# Patient Record
Sex: MALE | Race: WHITE | HISPANIC OR LATINO | Employment: FULL TIME | URBAN - METROPOLITAN AREA
[De-identification: names, ages, dates, MRNs, and addresses within clinical notes are randomized per-mention and may not be internally consistent; named-entity substitution may affect disease eponyms.]

---

## 2018-04-16 ENCOUNTER — OFFICE VISIT (OUTPATIENT)
Dept: URGENT CARE | Facility: CLINIC | Age: 43
End: 2018-04-16
Payer: COMMERCIAL

## 2018-04-16 VITALS
DIASTOLIC BLOOD PRESSURE: 64 MMHG | WEIGHT: 180 LBS | HEIGHT: 67 IN | OXYGEN SATURATION: 96 % | TEMPERATURE: 98 F | BODY MASS INDEX: 28.25 KG/M2 | SYSTOLIC BLOOD PRESSURE: 128 MMHG | HEART RATE: 95 BPM | RESPIRATION RATE: 16 BRPM

## 2018-04-16 DIAGNOSIS — J30.1 SEASONAL ALLERGIC RHINITIS DUE TO POLLEN: ICD-10-CM

## 2018-04-16 DIAGNOSIS — J45.31 MILD PERSISTENT EXTRINSIC ASTHMA WITH ACUTE EXACERBATION: Primary | ICD-10-CM

## 2018-04-16 PROCEDURE — 99284 EMERGENCY DEPT VISIT MOD MDM: CPT | Performed by: PHYSICIAN ASSISTANT

## 2018-04-16 PROCEDURE — G0383 LEV 4 HOSP TYPE B ED VISIT: HCPCS | Performed by: PHYSICIAN ASSISTANT

## 2018-04-16 RX ORDER — ALBUTEROL SULFATE 90 UG/1
2 AEROSOL, METERED RESPIRATORY (INHALATION) EVERY 6 HOURS PRN
COMMUNITY
End: 2019-03-02 | Stop reason: SDUPTHER

## 2018-04-16 RX ORDER — FLUOXETINE 10 MG/1
10 CAPSULE ORAL DAILY
COMMUNITY
End: 2018-07-09

## 2018-04-16 RX ORDER — BUTALBITAL, ACETAMINOPHEN AND CAFFEINE 50; 325; 40 MG/1; MG/1; MG/1
1 TABLET ORAL EVERY 4 HOURS PRN
COMMUNITY

## 2018-04-16 RX ORDER — CYCLOBENZAPRINE HCL 10 MG
10 TABLET ORAL 3 TIMES DAILY PRN
COMMUNITY
End: 2018-07-09

## 2018-04-16 RX ORDER — CETIRIZINE HYDROCHLORIDE, PSEUDOEPHEDRINE HYDROCHLORIDE 5; 120 MG/1; MG/1
1 TABLET, FILM COATED, EXTENDED RELEASE ORAL 2 TIMES DAILY
Qty: 30 TABLET | Refills: 0 | Status: SHIPPED | OUTPATIENT
Start: 2018-04-16

## 2018-04-16 RX ORDER — FLUTICASONE PROPIONATE 50 MCG
1 SPRAY, SUSPENSION (ML) NASAL DAILY
Qty: 16 G | Refills: 0 | Status: SHIPPED | OUTPATIENT
Start: 2018-04-16

## 2018-04-16 RX ORDER — IBUPROFEN 800 MG/1
TABLET ORAL EVERY 6 HOURS PRN
COMMUNITY

## 2018-04-16 NOTE — PROGRESS NOTES
[facility]        NAME: Argenis Mott is a 40 y o  male  : 1975    MRN: [id number]  DATE: 2018  TIME: 12:09 PM    Assessment and Plan   Mild persistent extrinsic asthma with acute exacerbation [J45 31]  1  Mild persistent extrinsic asthma with acute exacerbation  fluticasone-salmeterol (ADVAIR) 500-50 mcg/dose    cetirizine-pseudoephedrine (ZyrTEC-D) 5-120 MG per tablet   2  Seasonal allergic rhinitis due to pollen  fluticasone (FLONASE) 50 mcg/act nasal spray         Patient Instructions     Resume advair, flonase, zyrtec D as directed and albuterol as needed for your allergies/asthma  Follow up with your family physician to maintain these prescriptions  ER if worsening    Chief Complaint     Chief Complaint   Patient presents with    Allergies     pt has sinus congestion, headache, and pt stated he feels like it is working into his chest x 1 week  pt was using sudafed congestion and regular zrytec with no relief         History of Present Illness       39 y/o male presents with seasonal allergies affecting his asthma  He states that he usually takes advair, flonase and zyrtec D BID during allergy season, but he is out of those medications  He is c/o one week of allergy flare up causing nasal congestion, chest tightness, wheezing and dry cough  He has tried taking regular zyrtec w/o relief  He has been using his ventolin inhaler as needed  Review of Systems   Review of Systems   Constitutional: Negative for chills, fatigue and fever  HENT: Positive for congestion, postnasal drip, rhinorrhea, sinus pressure and sneezing  Negative for sore throat  Respiratory: Positive for cough, chest tightness, shortness of breath and wheezing  Cardiovascular: Negative for chest pain  Neurological: Positive for headaches  All other systems reviewed and are negative          Current Medications       Current Outpatient Prescriptions:     albuterol (PROVENTIL HFA,VENTOLIN HFA) 90 mcg/act inhaler, Inhale 2 puffs every 6 (six) hours as needed for wheezing, Disp: , Rfl:     cyclobenzaprine (FLEXERIL) 10 mg tablet, Take 10 mg by mouth 3 (three) times a day as needed for muscle spasms, Disp: , Rfl:     FLUoxetine (PROzac) 10 mg capsule, Take 10 mg by mouth daily, Disp: , Rfl:     ibuprofen (MOTRIN) 800 mg tablet, Take by mouth every 6 (six) hours as needed for mild pain, Disp: , Rfl:     omeprazole (PriLOSEC) 20 mg delayed release capsule, Take 20 mg by mouth daily  , Disp: , Rfl:     cetirizine-pseudoephedrine (ZyrTEC-D) 5-120 MG per tablet, Take 1 tablet by mouth 2 (two) times a day, Disp: 30 tablet, Rfl: 0    fluticasone (FLONASE) 50 mcg/act nasal spray, 1 spray into each nostril daily, Disp: 16 g, Rfl: 0    fluticasone-salmeterol (ADVAIR) 500-50 mcg/dose, Inhale 1 puff 2 (two) times a day, Disp: 1 Inhaler, Rfl: 0    Current Allergies     Allergies as of 04/16/2018    (No Known Allergies)            The following portions of the patient's history were reviewed and updated as appropriate: allergies, current medications, past family history, past medical history, past social history, past surgical history and problem list    Past Medical History:   Diagnosis Date    Major depression     Psychiatric disorder      History reviewed  No pertinent surgical history  Objective   /64   Pulse 95   Temp 98 °F (36 7 °C)   Resp 16   Ht 5' 7" (1 702 m)   Wt 81 6 kg (180 lb)   SpO2 96%   BMI 28 19 kg/m²        Physical Exam     Physical Exam   Constitutional: He is oriented to person, place, and time  He appears well-developed and well-nourished  HENT:   Head: Normocephalic and atraumatic  Right Ear: Tympanic membrane and external ear normal    Left Ear: Tympanic membrane and external ear normal    Mouth/Throat: Oropharynx is clear and moist    Neck: Neck supple  Cardiovascular: Normal rate, regular rhythm and normal heart sounds  Pulmonary/Chest: No respiratory distress  He has no wheezes  He exhibits no tenderness  Dry cough   Neurological: He is alert and oriented to person, place, and time  Psychiatric: He has a normal mood and affect  His behavior is normal    Nursing note and vitals reviewed

## 2018-04-16 NOTE — PATIENT INSTRUCTIONS
Resume advair, flonase, zyrtec D as directed and albuterol as needed for your allergies/asthma  Follow up with your family physician to maintain these prescriptions  ER if worsening    Allergies   WHAT YOU NEED TO KNOW:   Allergies are an immune system reaction to a substance called an allergen  Your immune system sees the allergen as harmful and attacks it  An allergic reaction can be mild or life-threatening  A life-threatening reaction is called anaphylaxis  Anaphylaxis is a sudden, life-threatening reaction that needs immediate treatment  DISCHARGE INSTRUCTIONS:   Call 911 for signs or symptoms of anaphylaxis,  such as trouble breathing, swelling in your mouth or throat, or wheezing  You may also have itching, a rash, hives, or feel like you are going to faint  Return to the emergency department if:   · You have tingling in your hands or feet  · Your skin is red or flushed  Contact your healthcare provider if:   · You have questions or concerns about your condition or care  Medicines:   · Antihistamines  help decrease itching, sneezing, and swelling  You may take them as a pill or use drops in your nose or eyes  · Decongestants  help your nose feel less stuffy  · Topical treatments  help decrease itching or swelling  You also may be given nasal sprays or eyedrops  · Epinephrine  is used to treat a severe allergic reaction such as anaphylaxis  · Take your medicine as directed  Contact your healthcare provider if you think your medicine is not helping or if you have side effects  Tell him of her if you are allergic to any medicine  Keep a list of the medicines, vitamins, and herbs you take  Include the amounts, and when and why you take them  Bring the list or the pill bottles to follow-up visits  Carry your medicine list with you in case of an emergency  Steps to take for signs or symptoms of anaphylaxis:   · Immediately  give 1 shot of epinephrine only into the outer thigh muscle  · Leave the shot in place  as directed  Your healthcare provider may recommend you leave it in place for up to 10 seconds before you remove it  This helps make sure all of the epinephrine is delivered  · Call 911 and go to the emergency department,  even if the shot improved symptoms  Do not drive yourself  Bring the used epinephrine shot with you  Safety precautions to take if you are at risk for anaphylaxis:   · Keep 2 shots of epinephrine with you at all times  You may need a second shot, because epinephrine only works for about 20 minutes and symptoms may return  Your healthcare provider can show you and family members how to give the shot  Check the expiration date every month and replace it before it expires  · Create an action plan  Your healthcare provider can help you create a written plan that explains the allergy and an emergency plan to treat a reaction  The plan explains when to give a second epinephrine shot if symptoms return or do not improve after the first  Give copies of the action plan and emergency instructions to family members, work and school staff, and  providers  Show them how to give a shot of epinephrine  · Be careful when you exercise  If you have had exercise-induced anaphylaxis, do not exercise right after you eat  Stop exercising right away if you start to develop any signs or symptoms of anaphylaxis  You may first feel tired, warm, or have itchy skin  Hives, swelling, and severe breathing problems may develop if you continue to exercise  · Carry medical alert identification  Wear medical alert jewelry or carry a card that explains the allergy  Ask your healthcare provider where to get these items  · Inform all healthcare providers of the allergy  This includes dentists, nurses, doctors, and surgeons  Manage allergies:   · Use nasal rinses as directed  Rinse with a saline solution daily to help clear your nose of allergens  · Do not smoke  Allergy symptoms may decrease if you are not around smoke  Nicotine and other chemicals in cigarettes and cigars can cause lung damage  Ask your healthcare provider for information if you currently smoke and need help to quit  E-cigarettes or smokeless tobacco still contain nicotine  Talk to your healthcare provider before you use these products  Prevent allergic reactions:   · Do not go outside when pollen counts are high if you have seasonal allergies  Your symptoms may be better if you go outside only in the morning or evening  Use your air conditioner, and change air filters often  · Avoid dust, fur, and mold  Dust and vacuum your home often  You may want to wear a mask when you vacuum  Keep pets in certain rooms, and bathe them often  Use a dehumidifier (machine that decreases moisture) to help prevent mold  · Do not use products that contain latex if you have a latex allergy  Use nonlatex gloves if you work in healthcare or in food preparation  Always tell healthcare providers about a latex allergy  · Avoid areas that attract insects if you have an insect bite or sting allergy  Areas include trash cans, gardens, and picnics  Do not wear bright clothing or strong scents when you will be outside  Follow up with your healthcare provider as directed:  Write down your questions so you remember to ask them during your visits  When you have an allergic reaction, write down everything you were exposed to in the 2 hours before the reaction  Take that information to your next visit  © 2017 [employer] Information is for End User's use only and may not be sold, redistributed or otherwise used for commercial purposes  All illustrations and images included in CareNotes® are the copyrighted property of [employer]  or [employer]  The above information is an  only  It is not intended as medical advice for individual conditions or treatments   Talk to your doctor, nurse or pharmacist before following any medical regimen to see if it is safe and effective for you  Allergic Rhinitis   WHAT YOU NEED TO KNOW:   Allergic rhinitis, or hay fever, is swelling of the inside of your nose  The swelling is a reaction to allergens in the air  An allergen can be anything that causes an allergic reaction  Allergies to weeds, grass, trees, or mold often cause seasonal allergic rhinitis  Indoor dust mites, cockroaches, pet dander, or mold can also cause allergic rhinitis  DISCHARGE INSTRUCTIONS:   Call 911 for the following:   · You have chest pain or shortness of breath  Return to the emergency department if:   · You have severe pain  · You cough up blood  Contact your healthcare provider if:   · You have a fever  · You have ear or sinus pain, or a headache  · Your symptoms get worse, even after treatment  · You have yellow, green, brown, or bloody mucus coming from your nose  · Your nose is bleeding or you have pain inside your nose  · You have trouble sleeping because of your symptoms  · You have questions or concerns about your condition or care  Medicines:   · Medicines  help decrease your symptoms and clear your stuffy nose  · Take your medicine as directed  Contact your healthcare provider if you think your medicine is not helping or if you have side effects  Tell him of her if you are allergic to any medicine  Keep a list of the medicines, vitamins, and herbs you take  Include the amounts, and when and why you take them  Bring the list or the pill bottles to follow-up visits  Carry your medicine list with you in case of an emergency  How to manage allergic rhinitis:  The best way to manage allergic rhinitis is to avoid allergens that can trigger your symptoms  Any of the following may help decrease your symptoms:  · Rinse your nose and sinuses  with a salt water solution or use a salt water nasal spray   This will help thin the mucus in your nose and rinse away pollen and dirt  It will also help reduce swelling so you can breathe normally  Ask your healthcare provider how often to rinse your nose  · Reduce exposure to dust mites  Wash sheets and towels in hot water every week  Cover your pillows and mattresses with allergen-free covers  Limit the number of stuffed animals and soft toys your child has  Wash your child's toys in hot water regularly  Vacuum weekly and use a vacuum  with an air filter  If possible, get rid of carpets and curtains  These collect dust and dust mites  · Reduce exposure to pollen  Keep windows and doors closed in your house and car  Stay inside when air pollution or the pollen count is high  Run your air conditioner on recycle, and change air filters often  Shower and wash your hair before bed every night to rinse away pollen  · Reduce exposure to pet dander  If possible, do not keep cats, dogs, birds, or other pets  If you do keep pets in your home, keep them out of bedrooms and carpeted rooms  Bathe them often  · Reduce exposure to mold  Do not spend time in basements  Choose artificial plants instead of live plants  Keep your home's humidity at less than 45%  Do not have ponds or standing water in your home or yard  · Do not smoke  Avoid others who smoke  Ask your healthcare provider for information if you currently smoke and need help to quit  Follow up with your healthcare provider as directed: You may need to see an allergist often to control your symptoms  Write down your questions so you remember to ask them during your visits  © 2017 [employer] Information is for End User's use only and may not be sold, redistributed or otherwise used for commercial purposes  All illustrations and images included in CareNotes® are the copyrighted property of [employer]  or [employer]ando Connell  The above information is an  only   It is not intended as medical advice for individual conditions or treatments  Talk to your doctor, nurse or pharmacist before following any medical regimen to see if it is safe and effective for you

## 2018-07-09 ENCOUNTER — HOSPITAL ENCOUNTER (EMERGENCY)
Facility: HOSPITAL | Age: 43
Discharge: HOME | End: 2018-07-09
Admitting: EMERGENCY MEDICINE

## 2018-07-09 ENCOUNTER — APPOINTMENT (EMERGENCY)
Dept: RADIOLOGY | Facility: HOSPITAL | Age: 43
End: 2018-07-09

## 2018-07-09 VITALS
SYSTOLIC BLOOD PRESSURE: 137 MMHG | WEIGHT: 165 LBS | TEMPERATURE: 97.2 F | DIASTOLIC BLOOD PRESSURE: 74 MMHG | BODY MASS INDEX: 25.9 KG/M2 | HEART RATE: 87 BPM | HEIGHT: 67 IN | RESPIRATION RATE: 18 BRPM | OXYGEN SATURATION: 96 %

## 2018-07-09 DIAGNOSIS — L03.031 PARONYCHIA OF GREAT TOE, RIGHT: Primary | ICD-10-CM

## 2018-07-09 PROCEDURE — 99283 EMERGENCY DEPT VISIT LOW MDM: CPT

## 2018-07-09 PROCEDURE — 73630 X-RAY EXAM OF FOOT: CPT

## 2018-07-09 RX ORDER — SULFAMETHOXAZOLE AND TRIMETHOPRIM 800; 160 MG/1; MG/1
1 TABLET ORAL 2 TIMES DAILY
Qty: 14 TABLET | Refills: 0 | Status: SHIPPED | OUTPATIENT
Start: 2018-07-09 | End: 2018-07-16

## 2018-07-09 NOTE — ED PROVIDER NOTES
History  Chief Complaint   Patient presents with    Toe Injury     Pt injured R toe while climbing out of truck  60-year-old male presents the ER with right great toe injury  Patient states that approximately a week ago he hurt his right great toe while getting out of his truck and states that area had been bleeding at that time  Patient states that since then the area has become red, swollen, and warm to touch and at times has had pus drainage from area  Patient works in pool construction and states that he has increased pain when trying to climb out of holes that have been dog for pulls and his future constantly getting wet due to his work  Patient denies fevers, chills, nausea, vomiting, streaking redness  Patient admits to increased pain, redness, warmth, swelling of area  Prior to Admission Medications   Prescriptions Last Dose Informant Patient Reported? Taking? albuterol (PROVENTIL HFA,VENTOLIN HFA) 90 mcg/act inhaler   Yes Yes   Sig: Inhale 2 puffs every 6 (six) hours as needed for wheezing   butalbital-acetaminophen-caffeine (FIORICET,ESGIC) -40 mg per tablet   Yes Yes   Sig: Take 1 tablet by mouth every 4 (four) hours as needed for headaches   cetirizine-pseudoephedrine (ZyrTEC-D) 5-120 MG per tablet   No Yes   Sig: Take 1 tablet by mouth 2 (two) times a day   fluticasone (FLONASE) 50 mcg/act nasal spray   No Yes   Si spray into each nostril daily   fluticasone-salmeterol (ADVAIR) 500-50 mcg/dose   No Yes   Sig: Inhale 1 puff 2 (two) times a day   ibuprofen (MOTRIN) 800 mg tablet   Yes Yes   Sig: Take by mouth every 6 (six) hours as needed for mild pain      Facility-Administered Medications: None       Past Medical History:   Diagnosis Date    Asthma     Major depression     Psychiatric disorder        History reviewed  No pertinent surgical history  History reviewed  No pertinent family history  I have reviewed and agree with the history as documented      Social History   Substance Use Topics    Smoking status: Current Every Day Smoker     Packs/day: 1 00     Types: Cigarettes    Smokeless tobacco: Never Used    Alcohol use No        Review of Systems   Constitutional: Negative  HENT: Negative  Eyes: Negative  Respiratory: Negative  Cardiovascular: Negative  Gastrointestinal: Negative  Genitourinary: Negative  Musculoskeletal: Positive for arthralgias and joint swelling  Negative for gait problem  Skin: Positive for color change and wound  Neurological: Negative  All other systems reviewed and are negative  Physical Exam  Physical Exam   Constitutional: He is oriented to person, place, and time  Vital signs are normal  He appears well-developed and well-nourished  HENT:   Head: Normocephalic and atraumatic  Eyes: Conjunctivae and EOM are normal  Pupils are equal, round, and reactive to light  Neck: Normal range of motion  Neck supple  Cardiovascular: Normal rate, regular rhythm and normal heart sounds  Pulses:       Dorsalis pedis pulses are 2+ on the right side, and 2+ on the left side  Pulmonary/Chest: Effort normal and breath sounds normal    Musculoskeletal: Normal range of motion  Right foot: There is tenderness and swelling  There is normal range of motion, no bony tenderness, normal capillary refill and no deformity  Feet:    Feet:   Right Foot:   Skin Integrity: Positive for erythema and warmth  Neurological: He is alert and oriented to person, place, and time  Skin: Skin is warm and dry  Capillary refill takes less than 2 seconds  There is erythema (With warmth and swelling to lateral aspect of great toe along side of the nail)  Psychiatric: He has a normal mood and affect  His speech is normal and behavior is normal  Judgment and thought content normal  Cognition and memory are normal    Nursing note and vitals reviewed        Vital Signs  ED Triage Vitals [07/09/18 1802]   Temperature Pulse Respirations Blood Pressure SpO2   (!) 97 2 °F (36 2 °C) 87 18 137/74 96 %      Temp Source Heart Rate Source Patient Position - Orthostatic VS BP Location FiO2 (%)   Temporal Monitor Sitting Right arm --      Pain Score       8           Vitals:    07/09/18 1802   BP: 137/74   Pulse: 87   Patient Position - Orthostatic VS: Sitting       Visual Acuity      ED Medications  Medications - No data to display    Diagnostic Studies  Results Reviewed     None                 XR foot 3+ vw right   Final Result by Giuliana Chavez MD (07/10 0820)      No acute osseous abnormality  Workstation performed: [id number]                    Procedures  Procedures       Phone Contacts  ED Phone Contact    ED Course                               MDM  Number of Diagnoses or Management Options  Paronychia of great toe, right: new and requires workup     Amount and/or Complexity of Data Reviewed  Tests in the radiology section of CPT®: ordered and reviewed    Patient Progress  Patient progress: stable    CritCare Time    Disposition  Final diagnoses:   Paronychia of great toe, right     Time reflects when diagnosis was documented in both MDM as applicable and the Disposition within this note     Time User Action Codes Description Comment    7/9/2018  7:31 PM Case Morales Add [Y38 [id number]] Paronychia of great toe, right       ED Disposition     ED Disposition Condition Comment    Discharge  [address] discharge to home/self care  Condition at discharge: Stable        Follow-up Information     Follow up With Specialties Details Why [address] [facility]  Call For Recheck, If symptoms worsen [facility]    [address]            Discharge Medication List as of 7/9/2018  7:33 PM      START taking these medications    Details   sulfamethoxazole-trimethoprim (BACTRIM DS) 800-160 mg per tablet Take 1 tablet by mouth 2 (two) times a day for 7 days smx-tmp DS (BACTRIM) 800-160 mg tabs (1tab q12 D10), Starting Mon 7/9/2018, Until Mon 7/16/2018, Normal         CONTINUE these medications which have NOT CHANGED    Details   albuterol (PROVENTIL HFA,VENTOLIN HFA) 90 mcg/act inhaler Inhale 2 puffs every 6 (six) hours as needed for wheezing, Historical Med      butalbital-acetaminophen-caffeine (FIORICET,ESGIC) -40 mg per tablet Take 1 tablet by mouth every 4 (four) hours as needed for headaches, Historical Med      cetirizine-pseudoephedrine (ZyrTEC-D) 5-120 MG per tablet Take 1 tablet by mouth 2 (two) times a day, Starting Mon 4/16/2018, Normal      fluticasone (FLONASE) 50 mcg/act nasal spray 1 spray into each nostril daily, Starting Mon 4/16/2018, Normal      fluticasone-salmeterol (ADVAIR) 500-50 mcg/dose Inhale 1 puff 2 (two) times a day, Starting Mon 4/16/2018, Normal      ibuprofen (MOTRIN) 800 mg tablet Take by mouth every 6 (six) hours as needed for mild pain, Historical Med           No discharge procedures on file      ED Provider  Electronically Signed by           Wilder West PA-C  07/17/18 9222

## 2018-07-09 NOTE — DISCHARGE INSTRUCTIONS
Paronychia   WHAT YOU NEED TO KNOW:   Paronychia is an infection of your nail fold caused by bacteria or a fungus  The nail fold is the skin around your nail  Paronychia may happen suddenly and last for 6 weeks or longer  You may have paronychia on more than 1 finger or toe  DISCHARGE INSTRUCTIONS:   Medicines:   · Td vaccine  is a booster shot used to help prevent tetanus and diphtheria  The Td booster may be given to adolescents and adults every 10 years or for certain wounds and injuries  · Antibiotics: This medicine will help fight or prevent an infection  It may be given as a pill, cream, or ointment  · Steroids: This medicine will help decrease inflammation  It may be given as a pill, cream, or ointment  · Antifungal medicine: This medicine helps kill fungus that may be causing your infection  It may be given as a cream or ointment  · NSAIDs:  These medicines decrease pain and swelling  NSAIDs are available without a doctor's order  Ask your healthcare provider which medicine is right for you  Ask how much to take and when to take it  Take as directed  NSAIDs can cause stomach bleeding and kidney problems if not taken correctly  · Take your medicine as directed  Contact your healthcare provider if you think your medicine is not helping or if you have side effects  Tell him of her if you are allergic to any medicine  Keep a list of the medicines, vitamins, and herbs you take  Include the amounts, and when and why you take them  Bring the list or the pill bottles to follow-up visits  Carry your medicine list with you in case of an emergency  Follow up with your healthcare provider as directed:  Write down your questions so you remember to ask them during your visits  Self-care:   · Soak your nail:  Soak your nail in a mixture of equal parts vinegar and water 3 or 4 times each day  This will help decrease inflammation      · Apply a warm compress:  Soak a washcloth in warm water and place it on your nail  This will help decrease inflammation  · Elevate:  Raise your nail above the level of your heart as often as you can  This will help decrease swelling and pain  Prop your nail on pillows or blankets to keep it elevated comfortably  · Use lotion:  Apply lotion after you wash your hands  This will prevent your skin from becoming too dry  Prevent paronychia:   · Avoid chemicals and allergens that may harm your skin and nails  This includes soaps, laundry detergents, and nail products  · Keep your nails clean and dry  Avoid soaking your nails in water  Use cotton-lined rubber gloves or wear 2 rubber gloves if you work with food or water  The gloves will help protect your nail folds  · Keep your nails short  Do not bite your nails, pick at your hangnails, suck your fingers, or wear fake nails  Bring your own nail tools when you go to the nail salon  Contact your healthcare provider if:   · Your nail becomes loose, deformed, or falls off  · You have a large abscess on your nail  · You have questions or concerns about your condition or care  Return to the emergency department if:   · You have severe nail pain  · The inflammation spreads to your hand or arm  © 2017 [facility] Information is for End User's use only and may not be sold, redistributed or otherwise used for commercial purposes  All illustrations and images included in CareNotes® are the copyrighted property of [employer]  or Hernando Connell  The above information is an  only  It is not intended as medical advice for individual conditions or treatments  Talk to your doctor, nurse or pharmacist before following any medical regimen to see if it is safe and effective for you

## 2019-03-02 ENCOUNTER — HOSPITAL ENCOUNTER (EMERGENCY)
Facility: HOSPITAL | Age: 44
Discharge: HOME | End: 2019-03-02
Attending: EMERGENCY MEDICINE
Payer: COMMERCIAL

## 2019-03-02 ENCOUNTER — APPOINTMENT (EMERGENCY)
Dept: RADIOLOGY | Facility: HOSPITAL | Age: 44
End: 2019-03-02
Payer: COMMERCIAL

## 2019-03-02 VITALS
OXYGEN SATURATION: 96 % | BODY MASS INDEX: 27.31 KG/M2 | RESPIRATION RATE: 20 BRPM | SYSTOLIC BLOOD PRESSURE: 117 MMHG | TEMPERATURE: 97.5 F | WEIGHT: 174 LBS | DIASTOLIC BLOOD PRESSURE: 81 MMHG | HEART RATE: 80 BPM | HEIGHT: 67 IN

## 2019-03-02 DIAGNOSIS — J45.31 MILD PERSISTENT EXTRINSIC ASTHMA WITH ACUTE EXACERBATION: ICD-10-CM

## 2019-03-02 DIAGNOSIS — J40 BRONCHITIS: ICD-10-CM

## 2019-03-02 DIAGNOSIS — J06.9 VIRAL URI WITH COUGH: Primary | ICD-10-CM

## 2019-03-02 LAB
ALBUMIN SERPL BCP-MCNC: 3.4 G/DL (ref 3.5–5)
ALP SERPL-CCNC: 82 U/L (ref 46–116)
ALT SERPL W P-5'-P-CCNC: 27 U/L (ref 12–78)
ANION GAP SERPL CALCULATED.3IONS-SCNC: 7 MMOL/L (ref 4–13)
AST SERPL W P-5'-P-CCNC: 26 U/L (ref 5–45)
BASOPHILS # BLD AUTO: 0.02 THOUSANDS/ΜL (ref 0–0.1)
BASOPHILS NFR BLD AUTO: 0 % (ref 0–1)
BILIRUB SERPL-MCNC: 0.2 MG/DL (ref 0.2–1)
BUN SERPL-MCNC: 17 MG/DL (ref 5–25)
CALCIUM SERPL-MCNC: 8.2 MG/DL (ref 8.3–10.1)
CHLORIDE SERPL-SCNC: 106 MMOL/L (ref 100–108)
CO2 SERPL-SCNC: 28 MMOL/L (ref 21–32)
CREAT SERPL-MCNC: 1.09 MG/DL (ref 0.6–1.3)
EOSINOPHIL # BLD AUTO: 0.32 THOUSAND/ΜL (ref 0–0.61)
EOSINOPHIL NFR BLD AUTO: 4 % (ref 0–6)
ERYTHROCYTE [DISTWIDTH] IN BLOOD BY AUTOMATED COUNT: 13 % (ref 11.6–15.1)
GFR SERPL CREATININE-BSD FRML MDRD: 83 ML/MIN/1.73SQ M
GLUCOSE SERPL-MCNC: 115 MG/DL (ref 65–140)
HCT VFR BLD AUTO: 41.3 % (ref 36.5–49.3)
HGB BLD-MCNC: 14.1 G/DL (ref 12–17)
IMM GRANULOCYTES # BLD AUTO: 0.01 THOUSAND/UL (ref 0–0.2)
IMM GRANULOCYTES NFR BLD AUTO: 0 % (ref 0–2)
LYMPHOCYTES # BLD AUTO: 2.69 THOUSANDS/ΜL (ref 0.6–4.47)
LYMPHOCYTES NFR BLD AUTO: 31 % (ref 14–44)
MCH RBC QN AUTO: 31.8 PG (ref 26.8–34.3)
MCHC RBC AUTO-ENTMCNC: 34.1 G/DL (ref 31.4–37.4)
MCV RBC AUTO: 93 FL (ref 82–98)
MONOCYTES # BLD AUTO: 0.68 THOUSAND/ΜL (ref 0.17–1.22)
MONOCYTES NFR BLD AUTO: 8 % (ref 4–12)
NEUTROPHILS # BLD AUTO: 4.86 THOUSANDS/ΜL (ref 1.85–7.62)
NEUTS SEG NFR BLD AUTO: 57 % (ref 43–75)
NRBC BLD AUTO-RTO: 0 /100 WBCS
PLATELET # BLD AUTO: 238 THOUSANDS/UL (ref 149–390)
PMV BLD AUTO: 9.5 FL (ref 8.9–12.7)
POTASSIUM SERPL-SCNC: 3.5 MMOL/L (ref 3.5–5.3)
PROT SERPL-MCNC: 6.9 G/DL (ref 6.4–8.2)
RBC # BLD AUTO: 4.43 MILLION/UL (ref 3.88–5.62)
SODIUM SERPL-SCNC: 141 MMOL/L (ref 136–145)
WBC # BLD AUTO: 8.58 THOUSAND/UL (ref 4.31–10.16)

## 2019-03-02 PROCEDURE — 71046 X-RAY EXAM CHEST 2 VIEWS: CPT

## 2019-03-02 PROCEDURE — 36415 COLL VENOUS BLD VENIPUNCTURE: CPT | Performed by: EMERGENCY MEDICINE

## 2019-03-02 PROCEDURE — 99284 EMERGENCY DEPT VISIT MOD MDM: CPT

## 2019-03-02 PROCEDURE — 85025 COMPLETE CBC W/AUTO DIFF WBC: CPT | Performed by: EMERGENCY MEDICINE

## 2019-03-02 PROCEDURE — 96372 THER/PROPH/DIAG INJ SC/IM: CPT

## 2019-03-02 PROCEDURE — 80053 COMPREHEN METABOLIC PANEL: CPT | Performed by: EMERGENCY MEDICINE

## 2019-03-02 RX ORDER — AZITHROMYCIN 250 MG/1
250 TABLET, FILM COATED ORAL EVERY 24 HOURS
Qty: 4 TABLET | Refills: 0 | Status: SHIPPED | OUTPATIENT
Start: 2019-03-03 | End: 2019-03-07

## 2019-03-02 RX ORDER — ALBUTEROL SULFATE 90 UG/1
2 AEROSOL, METERED RESPIRATORY (INHALATION) EVERY 6 HOURS PRN
Qty: 1 INHALER | Refills: 0 | Status: SHIPPED | OUTPATIENT
Start: 2019-03-02

## 2019-03-02 RX ORDER — PREDNISONE 20 MG/1
40 TABLET ORAL ONCE
Status: COMPLETED | OUTPATIENT
Start: 2019-03-02 | End: 2019-03-02

## 2019-03-02 RX ORDER — KETOROLAC TROMETHAMINE 30 MG/ML
15 INJECTION, SOLUTION INTRAMUSCULAR; INTRAVENOUS ONCE
Status: COMPLETED | OUTPATIENT
Start: 2019-03-02 | End: 2019-03-02

## 2019-03-02 RX ORDER — PREDNISONE 20 MG/1
40 TABLET ORAL DAILY
Qty: 8 TABLET | Refills: 0 | Status: SHIPPED | OUTPATIENT
Start: 2019-03-03 | End: 2019-03-07

## 2019-03-02 RX ORDER — AZITHROMYCIN 250 MG/1
500 TABLET, FILM COATED ORAL ONCE
Status: COMPLETED | OUTPATIENT
Start: 2019-03-02 | End: 2019-03-02

## 2019-03-02 RX ADMIN — PREDNISONE 40 MG: 20 TABLET ORAL at 20:30

## 2019-03-02 RX ADMIN — AZITHROMYCIN 500 MG: 250 TABLET, FILM COATED ORAL at 20:29

## 2019-03-02 RX ADMIN — DEXAMETHASONE SODIUM PHOSPHATE 10 MG: 10 INJECTION, SOLUTION INTRAMUSCULAR; INTRAVENOUS at 19:57

## 2019-03-02 RX ADMIN — KETOROLAC TROMETHAMINE 15 MG: 30 INJECTION, SOLUTION INTRAMUSCULAR; INTRAVENOUS at 19:57

## 2019-03-03 NOTE — ED PROVIDER NOTES
History  Chief Complaint   Patient presents with    Cough     Patient complaining of cough,sore throat and nasal congestion for the past two months     45 yo M with PMH of asthma and CHF presents with cough and nasal congestion for 2 months  No fevers  No recent travel  Multiple family members have been sick with similar  Over past 3 days has occasional vomiting, nonbloody, no other GI sx  No rashes  Has a sore throat and it hurts to swallow, but he's still tolerating both liquids and solids  Thinks he may have lost some weight  Former smoker  Has been trying his inhaler at home - does not help  OTC decongestants/cold medicines haven't helped either  Cough   Cough characteristics:  Productive  Sputum characteristics:  Clear  Severity:  Moderate  Onset quality:  Gradual  Duration:  8 weeks  Timing:  Constant  Progression:  Unchanged  Chronicity:  Recurrent  Smoker: no (quit 7 months ago)    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[employer]), sick contacts and upper respiratory infection    Relieved by:  Nothing  Worsened by:  Nothing  Ineffective treatments:  Decongestant, beta-agonist inhaler and cough suppressants  Associated symptoms: headaches (gets migraines when ill), rhinorrhea, sinus congestion, sore throat and weight loss (pt thinks he has lost weight)    Associated symptoms: no chest pain, no chills, no diaphoresis, no ear fullness, no ear pain, no eye discharge, no fever, no myalgias, no rash and no shortness of breath        Prior to Admission Medications   Prescriptions Last Dose Informant Patient Reported? Taking?    albuterol (PROVENTIL HFA,VENTOLIN HFA) 90 mcg/act inhaler   Yes No   Sig: Inhale 2 puffs every 6 (six) hours as needed for wheezing   albuterol (PROVENTIL HFA,VENTOLIN HFA) 90 mcg/act inhaler   No No   Sig: Inhale 2 puffs every 6 (six) hours as needed for wheezing   butalbital-acetaminophen-caffeine (FIORICET,ESGIC) -40 mg per tablet   Yes No   Sig: Take 1 tablet by mouth every 4 (four) hours as needed for headaches   cetirizine-pseudoephedrine (ZyrTEC-D) 5-120 MG per tablet   No No   Sig: Take 1 tablet by mouth 2 (two) times a day   fluticasone (FLONASE) 50 mcg/act nasal spray   No No   Si spray into each nostril daily   fluticasone-salmeterol (ADVAIR) 500-50 mcg/dose   No No   Sig: Inhale 1 puff 2 (two) times a day   fluticasone-salmeterol (ADVAIR) 500-50 mcg/dose inhaler   No No   Sig: Inhale 1 puff 2 (two) times a day   ibuprofen (MOTRIN) 800 mg tablet   Yes No   Sig: Take by mouth every 6 (six) hours as needed for mild pain      Facility-Administered Medications: None       Past Medical History:   Diagnosis Date    Asthma     CHF [facility]congestive heart failure) ([facility] [address] )     Major depression     Psychiatric disorder        History reviewed  No pertinent surgical history  History reviewed  No pertinent family history  I have reviewed and agree with the history as documented  Social History     Tobacco Use    Smoking status: Former Smoker    Smokeless tobacco: Never Used   Substance Use Topics    Alcohol use: No    Drug use: No        Review of Systems   Constitutional: Positive for weight loss (pt thinks he has lost weight)  Negative for chills, diaphoresis, fatigue, fever and unexpected weight change  HENT: Positive for congestion, rhinorrhea and sore throat  Negative for ear pain, trouble swallowing and voice change  Eyes: Negative for pain, discharge and visual disturbance  Respiratory: Positive for cough  Negative for chest tightness and shortness of breath  Cardiovascular: Negative for chest pain, palpitations and leg swelling  Gastrointestinal: Positive for vomiting  Negative for abdominal pain, blood in stool, constipation, diarrhea and nausea  Genitourinary: Negative for difficulty urinating and hematuria  Musculoskeletal: Negative for arthralgias, back pain, myalgias and neck pain  Skin: Negative for rash     Neurological: Positive for headaches (gets migraines when ill)  Negative for dizziness, syncope and light-headedness  Psychiatric/Behavioral: Negative for confusion and suicidal ideas  The patient is not nervous/anxious  Physical Exam  Physical Exam   Constitutional: He is oriented to person, place, and time  He appears well-developed and well-nourished  No distress  HENT:   Head: Normocephalic and atraumatic  Right Ear: Tympanic membrane and external ear normal    Left Ear: Tympanic membrane and external ear normal    Nose: Rhinorrhea present  Mouth/Throat: Uvula is midline and mucous membranes are normal  No trismus in the jaw  No uvula swelling  Posterior oropharyngeal erythema (mild) present  Eyes: Pupils are equal, round, and reactive to light  Conjunctivae and EOM are normal  Right eye exhibits no discharge  Left eye exhibits no discharge  No scleral icterus  Neck: Normal range of motion  Neck supple  No JVD present  No neck rigidity  No tracheal deviation present  No thyromegaly present  Cardiovascular: Normal rate, regular rhythm, normal heart sounds and intact distal pulses  Exam reveals no gallop and no friction rub  No murmur heard  HR 70s NSR on telemetry during exam     Pulmonary/Chest: Effort normal and breath sounds normal  No stridor  No respiratory distress  He has no wheezes  He has no rales  He exhibits no tenderness  Abdominal: Soft  Bowel sounds are normal  He exhibits no distension  There is no tenderness  There is no rebound and no guarding  Musculoskeletal: Normal range of motion  He exhibits no edema, tenderness or deformity  Lymphadenopathy:     He has cervical adenopathy  Neurological: He is alert and oriented to person, place, and time  No cranial nerve deficit or sensory deficit  Coordination normal    Skin: Skin is warm and dry  No rash noted  He is not diaphoretic  Psychiatric: He has a normal mood and affect  His behavior is normal    Nursing note and vitals reviewed        Vital Signs  ED Triage Vitals [03/02/19 1922]   Temperature Pulse Respirations Blood Pressure SpO2   97 5 °F (36 4 °C) (!) 110 20 120/80 97 %      Temp Source Heart Rate Source Patient Position - Orthostatic VS BP Location FiO2 (%)   Tympanic Monitor Sitting Right arm --      Pain Score       8           Vitals:    03/02/19 1922 03/02/19 1930 03/02/19 1945 03/02/19 2000   BP: 120/80 113/75 117/81    Pulse: (!) 110 88 87 80   Patient Position - Orthostatic VS: Sitting          Visual Acuity      ED Medications  Medications   dexamethasone 10 mg/mL oral liquid 10 mg 1 mL (10 mg Oral Given 3/2/19 1957)   ketorolac (TORADOL) injection 15 mg (15 mg Intramuscular Given 3/2/19 1957)   azithromycin (ZITHROMAX) tablet 500 mg (500 mg Oral Given 3/2/19 2029)   predniSONE tablet 40 mg (40 mg Oral Given 3/2/19 2030)       Diagnostic Studies  Results Reviewed     Procedure Component Value Units Date/Time    Comprehensive metabolic panel [[id number]]  (Abnormal) Collected:  03/02/19 1955    Lab Status:  Final result Specimen:  Blood from Arm, Right Updated:  03/02/19 2019     Sodium 141 mmol/L      Potassium 3 5 mmol/L      Chloride 106 mmol/L      CO2 28 mmol/L      ANION GAP 7 mmol/L      BUN 17 mg/dL      Creatinine 1 09 mg/dL      Glucose 115 mg/dL      Calcium 8 2 mg/dL      AST 26 U/L      ALT 27 U/L      Alkaline Phosphatase 82 U/L      Total Protein 6 9 g/dL      Albumin 3 4 g/dL      Total Bilirubin 0 20 mg/dL      eGFR 83 ml/min/1 73sq m     Narrative:       National Kidney Disease Education Program recommendations are as follows:  GFR calculation is accurate only with a steady state creatinine  Chronic Kidney disease less than 60 ml/min/1 73 sq  meters  Kidney failure less than 15 ml/min/1 73 sq  meters      CBC and differential [[id number]] Collected:  03/02/19 1955    Lab Status:  Final result Specimen:  Blood from Arm, Right Updated:  03/02/19 2003     WBC 8 58 Thousand/uL      RBC 4 43 Million/uL      Hemoglobin 14 1 g/dL Hematocrit 41 3 %      MCV 93 fL      MCH 31 8 pg      MCHC 34 1 g/dL      RDW 13 0 %      MPV 9 5 fL      Platelets 491 Thousands/uL      nRBC 0 /100 WBCs      Neutrophils Relative 57 %      Immat GRANS % 0 %      Lymphocytes Relative 31 %      Monocytes Relative 8 %      Eosinophils Relative 4 %      Basophils Relative 0 %      Neutrophils Absolute 4 86 Thousands/µL      Immature Grans Absolute 0 01 Thousand/uL      Lymphocytes Absolute 2 69 Thousands/µL      Monocytes Absolute 0 68 Thousand/µL      Eosinophils Absolute 0 32 Thousand/µL      Basophils Absolute 0 02 Thousands/µL                  XR chest 2 views   ED Interpretation by Jessica Terrazas MD (03/02 2020)   No acute disease  Final Result by Aldo Fabian DO (03/02 2020)      No acute cardiopulmonary disease  Workstation performed: [id number]                    Procedures  Procedures       Phone Contacts  ED Phone Contact    ED Course  ED Course as of Mar 02 2031   Sat Mar 02, 2019   1956 Pt nontoxic appearing  Exam as noted  Pt well hydrated  HD stable  Suspect viral syndrome, but will check basic labs and CXR to r/o PNA/mass  Toradol and decadron for sx relief  2021 Labs unremarkable  2030 Pt feeling slightly improved, using phone during reevaluation  Discussed unremarkable CXR and labs  Discussed tx for bronchitis  Will refill inhalers  Discussed supportive care at home, incl honey for cough, and decongestants  Recommend f/u with PCP, and RTED if sx worsen  Pt agrees with plan                                     MDM  Number of Diagnoses or Management Options  Bronchitis: new and requires workup  Viral URI with cough: new and requires workup     Amount and/or Complexity of Data Reviewed  Clinical lab tests: ordered and reviewed  Tests in the radiology section of CPT®: ordered and reviewed  Review and summarize past medical records: yes  Independent visualization of images, tracings, or specimens: yes    Risk of Complications, Morbidity, and/or Mortality  Presenting problems: moderate  Diagnostic procedures: minimal  Management options: minimal    Patient Progress  Patient progress: improved      Disposition  Final diagnoses:   Viral URI with cough   Bronchitis     Time reflects when diagnosis was documented in both MDM as applicable and the Disposition within this note     Time User Action Codes Description Comment    3/2/2019  8:21 PM Baby Carlos Add [J06 9,  B97 89] Viral URI with cough     3/2/2019  8:21 PM Gabrielle Bible S Add [J40] Bronchitis     3/2/2019  8:27 PM Baby Carlos Add [J45 31] Mild persistent extrinsic asthma with acute exacerbation       ED Disposition     ED Disposition Condition Date/Time Comment    Discharge Stable Sat Mar 2, 2019  8:25 PM Yossi Rivera discharge to home/self care  Follow-up Information     Follow up With Specialties Details Why Contact Info Additional Information    Melanie Blum  Schedule an appointment as soon as possible for a visit   [facility] [place]  [address] Emergency Department Emergency Medicine  If symptoms worsen [address] ED, [address], [facility]          Patient's Medications   Discharge Prescriptions    AZITHROMYCIN (ZITHROMAX) 250 MG TABLET    Take 1 tablet (250 mg total) by mouth every 24 hours for 4 days Take 1 tablet daily       Start Date: 3/3/2019  End Date: 3/7/2019       Order Dose: 250 mg       Quantity: 4 tablet    Refills: 0    PREDNISONE 20 MG TABLET    Take 2 tablets (40 mg total) by mouth daily for 4 days       Start Date: 3/3/2019  End Date: 3/7/2019       Order Dose: 40 mg       Quantity: 8 tablet    Refills: 0     No discharge procedures on file      ED Provider  Electronically Signed by           Angel Crowe MD  03/02/19 2031

## 2019-03-13 ENCOUNTER — APPOINTMENT (EMERGENCY)
Dept: CT IMAGING | Facility: HOSPITAL | Age: 44
End: 2019-03-13
Payer: COMMERCIAL

## 2019-03-13 ENCOUNTER — HOSPITAL ENCOUNTER (EMERGENCY)
Facility: HOSPITAL | Age: 44
Discharge: HOME | End: 2019-03-13
Attending: EMERGENCY MEDICINE | Admitting: EMERGENCY MEDICINE
Payer: COMMERCIAL

## 2019-03-13 ENCOUNTER — APPOINTMENT (EMERGENCY)
Dept: RADIOLOGY | Facility: HOSPITAL | Age: 44
End: 2019-03-13
Payer: COMMERCIAL

## 2019-03-13 VITALS
BODY MASS INDEX: 26.68 KG/M2 | OXYGEN SATURATION: 97 % | HEIGHT: 67 IN | TEMPERATURE: 99.1 F | RESPIRATION RATE: 20 BRPM | WEIGHT: 170 LBS | HEART RATE: 78 BPM | SYSTOLIC BLOOD PRESSURE: 106 MMHG | DIASTOLIC BLOOD PRESSURE: 74 MMHG

## 2019-03-13 DIAGNOSIS — S93.401A RIGHT ANKLE SPRAIN: ICD-10-CM

## 2019-03-13 DIAGNOSIS — M54.50 LUMBAR BACK PAIN: Primary | ICD-10-CM

## 2019-03-13 DIAGNOSIS — R91.1 PULMONARY NODULE: ICD-10-CM
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
## 2019-03-13 LAB
ANION GAP BLD CALC-SCNC: 14 MMOL/L (ref 4–13)
BUN BLD-MCNC: 19 MG/DL (ref 5–25)
CA-I BLD-SCNC: 1.22 MMOL/L (ref 1.12–1.32)
CHLORIDE BLD-SCNC: 101 MMOL/L (ref 100–108)
CREAT BLD-MCNC: 0.9 MG/DL (ref 0.6–1.3)
GFR SERPL CREATININE-BSD FRML MDRD: 105 ML/MIN/1.73SQ M
GLUCOSE SERPL-MCNC: 83 MG/DL (ref 65–140)
HCT VFR BLD CALC: 44 % (ref 36.5–49.3)
HGB BLDA-MCNC: 15 G/DL (ref 12–17)
PCO2 BLD: 29 MMOL/L (ref 21–32)
POTASSIUM BLD-SCNC: 3.7 MMOL/L (ref 3.5–5.3)
SODIUM BLD-SCNC: 140 MMOL/L (ref 136–145)
SPECIMEN SOURCE: ABNORMAL

## 2019-03-13 PROCEDURE — 96374 THER/PROPH/DIAG INJ IV PUSH: CPT

## 2019-03-13 PROCEDURE — 71260 CT THORAX DX C+: CPT

## 2019-03-13 PROCEDURE — 74177 CT ABD & PELVIS W/CONTRAST: CPT

## 2019-03-13 PROCEDURE — 73610 X-RAY EXAM OF ANKLE: CPT

## 2019-03-13 PROCEDURE — 85014 HEMATOCRIT: CPT

## 2019-03-13 PROCEDURE — 99284 EMERGENCY DEPT VISIT MOD MDM: CPT

## 2019-03-13 PROCEDURE — 80047 BASIC METABLC PNL IONIZED CA: CPT

## 2019-03-13 RX ORDER — KETOROLAC TROMETHAMINE 30 MG/ML
15 INJECTION, SOLUTION INTRAMUSCULAR; INTRAVENOUS ONCE
Status: COMPLETED | OUTPATIENT
Start: 2019-03-13 | End: 2019-03-13

## 2019-03-13 RX ORDER — LIDOCAINE 50 MG/G
1 PATCH TOPICAL ONCE
Status: DISCONTINUED | OUTPATIENT
Start: 2019-03-13 | End: 2019-03-13 | Stop reason: HOSPADM

## 2019-03-13 RX ADMIN — KETOROLAC TROMETHAMINE 15 MG: 30 INJECTION, SOLUTION INTRAMUSCULAR; INTRAVENOUS at 19:41

## 2019-03-13 RX ADMIN — IOHEXOL 100 ML: 350 INJECTION, SOLUTION INTRAVENOUS at 20:23

## 2019-03-13 RX ADMIN — LIDOCAINE 1 PATCH: 50 PATCH TOPICAL at 21:18

## 2019-03-13 NOTE — ED PROVIDER NOTES
History  Chief Complaint   Patient presents with    Fall     patient presents to the ED with c/o left rib and left lower back pain, right ankle pain and right shoulder pain after falling on black ice earlier today  Patient denies LOC or hitting his head      44year old male presents for evaluation of back, left chest wall and right ankle pain after falling on ice in the [employer] parking lot at 10 am this morning  Patient was able to go to work after the incident, but has had gradually worsening pain throughout the day  Patient has not taken anything for pain  He denies head strike or LOC  He reports migraine headache similar to headaches in the past  No numbness or weakness  History provided by:  Patient  Fall   Mechanism of injury: fall    Injury location:  Torso  Torso injury location:  L flank, L chest and back  Incident location: [employer] parking lot  Time since incident:  9 hours  Arrived directly from scene: no    Fall:     Fall occurred:  Standing    Height of fall:  Ground level    Impact surface:  [place]    Point of impact: left side  Suspicion of alcohol use: no    Suspicion of drug use: no    Prior to arrival data:     Bystander interventions:  None    Patient ambulatory at scene: yes      Blood loss:  None    Responsiveness at scene:  Alert    Loss of consciousness: no      Amnesic to event: no      Medications administered:  None    Immobilization:  None  Current pain details:     Pain quality:  Aching    Pain Severity:  Severe    Pain timing:  Constant  Associated symptoms: headaches    Associated symptoms: no abdominal pain, no chest pain, no nausea, no neck pain and no vomiting    Risk factors: asthma    Risk factors: no anticoagulation therapy and no beta blocker therapy        Prior to Admission Medications   Prescriptions Last Dose Informant Patient Reported? Taking?    albuterol (PROVENTIL HFA,VENTOLIN HFA) 90 mcg/act inhaler   No No   Sig: Inhale 2 puffs every 6 (six) hours as needed for wheezing   butalbital-acetaminophen-caffeine (FIORICET,ESGIC) -40 mg per tablet   Yes No   Sig: Take 1 tablet by mouth every 4 (four) hours as needed for headaches   cetirizine-pseudoephedrine (ZyrTEC-D) 5-120 MG per tablet   No No   Sig: Take 1 tablet by mouth 2 (two) times a day   fluticasone (FLONASE) 50 mcg/act nasal spray   No No   Si spray into each nostril daily   fluticasone-salmeterol (ADVAIR) 500-50 mcg/dose inhaler   No No   Sig: Inhale 1 puff 2 (two) times a day   ibuprofen (MOTRIN) 800 mg tablet   Yes No   Sig: Take by mouth every 6 (six) hours as needed for mild pain      Facility-Administered Medications: None       Past Medical History:   Diagnosis Date    Asthma     Hyperlipidemia     Major depression     Psychiatric disorder        History reviewed  No pertinent surgical history  History reviewed  No pertinent family history  I have reviewed and agree with the history as documented  Social History     Tobacco Use    Smoking status: Former Smoker    Smokeless tobacco: Never Used   Substance Use Topics    Alcohol use: No    Drug use: No        Review of Systems   Constitutional: Negative for appetite change, diaphoresis, fatigue and fever  HENT: Positive for congestion (nasal congestion for 3 months, unchanged)  Negative for rhinorrhea and sore throat  Respiratory: Positive for cough (3 months, improving)  Negative for chest tightness and shortness of breath  Cardiovascular: Negative for chest pain, palpitations and leg swelling  Gastrointestinal: Negative for abdominal pain, constipation, diarrhea, nausea and vomiting  Genitourinary: Negative for difficulty urinating, dysuria, frequency and hematuria  Musculoskeletal: Negative for myalgias, neck pain and neck stiffness  Skin: Negative for pallor  Neurological: Positive for headaches  Negative for syncope and weakness  All other systems reviewed and are negative        Physical Exam  Physical Exam   Constitutional: He is oriented to person, place, and time  He appears well-developed and well-nourished  Non-toxic appearance  No distress  HENT:   Head: Normocephalic and atraumatic  Eyes: Pupils are equal, round, and reactive to light  EOM are normal    Neck: Normal range of motion  No tracheal deviation present  No thyromegaly present  Cardiovascular: Normal rate, regular rhythm, normal heart sounds and intact distal pulses  Pulmonary/Chest: Effort normal and breath sounds normal    Left lateral chest wall tenderness  No crepitus   Abdominal: Soft  Bowel sounds are normal  He exhibits no distension  There is no tenderness  Musculoskeletal:   No C-spine tenderness  T and L spine tenderness  No step offs or deformities  Lateral right malleolus tenderness  No medial malleolus or forefoot tenderness  2+ DP/PT pulses  Normal distal sensation  No significant edema  Lymphadenopathy:     He has no cervical adenopathy  Neurological: He is alert and oriented to person, place, and time  Skin: Skin is warm and dry  He is not diaphoretic  Nursing note and vitals reviewed        Vital Signs  ED Triage Vitals [03/13/19 1848]   Temperature Pulse Respirations Blood Pressure SpO2   99 1 °F (37 3 °C) 98 20 134/77 97 %      Temp Source Heart Rate Source Patient Position - Orthostatic VS BP Location FiO2 (%)   Temporal Monitor Sitting Right arm --      Pain Score       8           Vitals:    03/13/19 1930 03/13/19 1945 03/13/19 2001 03/13/19 2050   BP: 125/75 126/65 106/58 94/56   Pulse: 93 100 92    Patient Position - Orthostatic VS: Lying  Lying Lying       qSOFA     Row Name 03/13/19 2050 03/13/19 2001 03/13/19 1945 03/13/19 1930 03/13/19 1915    Altered mental status GCS < 15  --  --  --  --  --    Respiratory Rate > / =22  0  0  --  0  --    Systolic BP < / =033  1  0  0  0  0    Q Sofa Score  1  0  0  0  0    Row Name 03/13/19 1900 03/13/19 1848             Altered mental status GCS < 15  --  -- Respiratory Rate > / =22  0  0       Systolic BP < / =794  0  0       Q Sofa Score  0  0             Visual Acuity      ED Medications  Medications   lidocaine (LIDODERM) 5 % patch 1 patch (has no administration in time range)   ketorolac (TORADOL) injection 15 mg (15 mg Intravenous Given 3/13/19 1941)   iohexol (OMNIPAQUE) 350 MG/ML injection (MULTI-DOSE) 100 mL (100 mL Intravenous Given 3/13/19 2023)       Diagnostic Studies  Results Reviewed     Procedure Component Value Units Date/Time    POCT Chem 8+ [[id number]]  (Abnormal) Collected:  03/13/19 1941    Lab Status:  Final result Specimen:  Venous Updated:  03/13/19 1946     SODIUM, I-STAT 140 mmol/l      Potassium, i-STAT 3 7 mmol/L      Chloride, istat 101 mmol/L      CO2, i-STAT 29 mmol/L      Anion Gap, i-STAT 14 mmol/L      Calcium, Ionized i-STAT 1 22 mmol/L      BUN, I-STAT 19 mg/dl      Creatinine, i-STAT 0 9 mg/dl      eGFR 105 ml/min/1 73sq m      Glucose, i-STAT 83 mg/dl      Hct, i-STAT 44 %      Hgb, i-STAT 15 0 g/dl      Specimen Type VENOUS                 CT chest abdomen pelvis w contrast   Final Result by Tatyana Quinones MD (03/13 2054)   1  No findings for acute injury in the chest, abdomen or pelvis  2   5 mm right apical nodule  Based on current Fleischner Society 2017 Guidelines on incidental pulmonary nodule, no routine follow-up is needed if the patient is considered low risk for lung cancer  If the patient is considered high risk for lung    cancer, 12 month follow-up non-contrast chest CT is recommended  The study was marked in EPIC for significant notification           Workstation performed: [id number]         XR ankle 3+ views RIGHT   ED Interpretation by Rikki Rowland MD (03/13 2034)   No acute fractures or dislocations                 Procedures  Procedures       Phone Contacts  ED Phone Contact    ED Course                               MDM  Number of Diagnoses or Management Options  Fall due to slipping on ice or snow, initial encounter: new and requires workup  Lumbar back pain: new and requires workup  Pulmonary nodule: new and requires workup  Right ankle sprain: new and requires workup  Diagnosis management comments: 44year old male presents for evaluation of back and ankle pain after fall  No fractures on CT chest/abd/pelvis or xray of right ankle  Ace wrap applied to right ankle with normal distal sensation and perfusion both prior to and after placement  Patient informed of incidental pulmonary nodule  PCP follow up  Discussed return precautions with patient  Amount and/or Complexity of Data Reviewed  Clinical lab tests: ordered and reviewed  Tests in the radiology section of CPT®: ordered and reviewed    Patient Progress  Patient progress: stable      Disposition  Final diagnoses:   Pulmonary nodule - 5mm right apical   Lumbar back pain   Right ankle sprain   Fall due to slipping on ice or snow, initial encounter     Time reflects when diagnosis was documented in both MDM as applicable and the Disposition within this note     Time User Action Codes Description Comment    3/13/2019  8:56 PM Lorenza Glory Add [R91 1] Pulmonary nodule     3/13/2019  8:56 PM Lorenza Glory Add [M54 5] Lumbar back pain     3/13/2019  8:56 PM Lorenza Glory Add [S93 401A] Right ankle sprain     3/13/2019  8:57 PM Lorenza Glory Modify [R91 1] Pulmonary nodule 5mm right apical    3/13/2019  8:57 PM Lorenza Glory Modify [R91 1] Pulmonary nodule 5mm right apical    3/13/2019  8:57 PM Lorenza Glory Modify [M54 5] Lumbar back pain     3/13/2019  8:57 PM Lorenza Glory Add [D64 516F] Fall on ice wearing ice-skates, initial encounter     3/13/2019  8:57 PM Zahraa Vargas [I20 707L] Fall on ice wearing ice-skates, initial encounter     3/13/2019  8:57 PM Lorenza Glory Add [W00  9XXA] Fall due to slipping on ice or snow, initial encounter       ED Disposition     ED Disposition Condition Date/Time Comment    Discharge Stable Wed Mar 13, 2019  9:01 PM Valerie Rivera discharge to home/self care  Follow-up Information     Follow up With Specialties Details Why Contact Info Additional Information    Yuri Mccann  In 12 months to follow up for your pulmonary nodule [address] [facility]  [address] [place] [address] Emergency Department Emergency Medicine Go to  If symptoms worsen [address] [facility]  [address] [place] [address] [address] ED, [address], [facility]          Patient's Medications   Discharge Prescriptions    No medications on file     No discharge procedures on file      ED Provider  Electronically Signed by           Sue Collins MD  03/13/19 1351

## 2019-03-14 NOTE — ED NOTES
Pt states good pain relief since Toradol, H/A has improved also, pain 6/10  Pt repositioning self freely on stretcher        Sammi Clemens RN  03/13/19 2055       Sammi Clemens RN  03/13/19 2056

## 2019-03-14 NOTE — DISCHARGE INSTRUCTIONS
Lidocaine patches may help with your back pain  These are available over-the-counter  Look for lidocaine in the list of active ingredients  Take 1000 mg tylenol up to 3 times daily and 400 mg ibuprofen every 6 hours as needed for pain  Acute Low Back Pain   WHAT YOU NEED TO KNOW:   Acute low back pain is sudden discomfort in your lower back area that lasts for up to 6 weeks  The discomfort makes it difficult to tolerate activity  DISCHARGE INSTRUCTIONS:   Return to the emergency department if:   You have severe pain  You have sudden stiffness and heaviness on both buttocks down to both legs  You have numbness or weakness in one leg, or pain in both legs  You have numbness in your genital area or across your lower back  You cannot control your urine or bowel movements  Contact your healthcare provider if:   You have a fever  You have pain at night or when you rest     Your pain does not get better with treatment  You have pain that worsens when you cough or sneeze  You suddenly feel something pop or snap in your back  You have questions or concerns about your condition or care  Medicines: The following medicines may be ordered by your healthcare provider:  Acetaminophen  decreases pain  It is available without a doctor's order  Ask how much to take and how often to take it  Follow directions  Acetaminophen can cause liver damage if not taken correctly  NSAIDs  help decrease swelling and pain  This medicine is available with or without a doctor's order  NSAIDs can cause stomach bleeding or kidney problems in certain people  If you take blood thinner medicine, always ask your healthcare provider if NSAIDs are safe for you  Always read the medicine label and follow directions  Prescription pain medicine  may be given  Ask your healthcare provider how to take this medicine safely  Muscle relaxers  decrease pain by relaxing the muscles in your lower spine      Take your medicine as directed  Contact your healthcare provider if you think your medicine is not helping or if you have side effects  Tell him of her if you are allergic to any medicine  Keep a list of the medicines, vitamins, and herbs you take  Include the amounts, and when and why you take them  Bring the list or the pill bottles to follow-up visits  Carry your medicine list with you in case of an emergency  Self-care:   Stay active  as much as you can without causing more pain  Bed rest could make your back pain worse  Start with some light exercises such as walking  Avoid heavy lifting until your pain is gone  Ask for more information about the activities or exercises that are right for you  Ice  helps decrease swelling, pain, and muscle spams  Put crushed ice in a plastic bag  Cover it with a towel  Place it on your lower back for 20 to 30 minutes every 2 hours  Do this for about 2 to 3 days after your pain starts, or as directed  Heat  helps decrease pain and muscle spasms  Start to use heat after treatment with ice has stopped  Use a small towel dampened with warm water or a heating pad, or sit in a warm bath  Apply heat on the area for 20 to 30 minutes every 2 hours for as many days as directed  Alternate heat and ice  Prevent acute low back pain:   Use proper body mechanics  Bend at the hips and knees when you  objects  Do not bend from the waist  Use your leg muscles as you lift the load  Do not use your back  Keep the object close to your chest as you lift it  Try not to twist or lift anything above your waist     Change your position often when you stand for long periods of time  Rest one foot on a small box or footrest, and then switch to the other foot often  Try not to sit for long periods of time  When you do, sit in a straight-backed chair with your feet flat on the floor  Never reach, pull, or push while you are sitting  Do exercises that strengthen your back muscles    Warm up before you exercise  Ask your healthcare provider the best exercises for you  Maintain a healthy weight  Ask your healthcare provider how much you should weigh  Ask him to help you create a weight loss plan if you are overweight  Follow up with your healthcare provider as directed:  Return for a follow-up visit if you still have pain after 1 to 3 weeks of treatment  You may need to visit an orthopedist if your back pain lasts more than 12 weeks  Write down your questions so you remember to ask them during your visits  © 2017 [employer] Information is for End User's use only and may not be sold, redistributed or otherwise used for commercial purposes  All illustrations and images included in CareNotes® are the copyrighted property of [employer]  or [employer]  The above information is an  only  It is not intended as medical advice for individual conditions or treatments  Talk to your doctor, nurse or pharmacist before following any medical regimen to see if it is safe and effective for you  Ankle Sprain   WHAT YOU NEED TO KNOW:   An ankle sprain happens when 1 or more ligaments in your ankle joint stretch or tear  Ligaments are tough tissues that connect bones  Ligaments support your joints and keep your bones in place  DISCHARGE INSTRUCTIONS:   Return to the emergency department if:   You have severe pain in your ankle  Your foot or toes are cold or numb  Your ankle becomes more weak or unstable (wobbly)  You are unable to put any weight on your ankle or foot  Your swelling has increased or returned  Contact your healthcare provider if:   Your pain does not go away, even after treatment  You have questions or concerns about your condition or care  Medicines: You may need any of the following:  NSAIDs , such as ibuprofen, help decrease swelling, pain, and fever  This medicine is available with or without a doctor's order   NSAIDs can cause stomach bleeding or kidney problems in certain people  If you take blood thinner medicine, always ask your healthcare provider if NSAIDs are safe for you  Always read the medicine label and follow directions  Acetaminophen  decreases pain  It is available without a doctor's order  Ask how much to take and how often to take it  Follow directions  Acetaminophen can cause liver damage if not taken correctly  Prescription pain medicine  may be given  Ask how to take this medicine safely  Take your medicine as directed  Contact your healthcare provider if you think your medicine is not helping or if you have side effects  Tell him or her if you are allergic to any medicine  Keep a list of the medicines, vitamins, and herbs you take  Include the amounts, and when and why you take them  Bring the list or the pill bottles to follow-up visits  Carry your medicine list with you in case of an emergency  Self care:   Use support devices,  such as a brace, cast, or splint, may be needed to limit your movement and protect your joint  You may need to use crutches to decrease your pain as you move around  Go to physical therapy as directed  A physical therapist teaches you exercises to help improve movement and strength, and to decrease pain  Rest  your ankle so that it can heal  Return to normal activities as directed  Apply ice on your ankle for 15 to 20 minutes every hour or as directed  Use an ice pack, or put crushed ice in a plastic bag  Cover it with a towel  Ice helps prevent tissue damage and decreases swelling and pain  Compress  your ankle  Ask if you should wrap an elastic bandage around your injured ligament  An elastic bandage provides support and helps decrease swelling and movement so your joint can heal  Wear as long as directed  Elevate  your ankle above the level of your heart as often as you can  This will help decrease swelling and pain   Prop your ankle on pillows or blankets to keep it elevated comfortably  Prevent another ankle sprain:   Let your ankle heal   Find out how long your ligament needs to heal  Do not do any physical activity until your healthcare provider says it is okay  If you start activity too soon, you may develop a more serious injury  Always warm up and stretch  before you exercise or play sports  Use the right equipment  Always wear shoes that fit well and are made for the activity that you are doing  You may also need ankle supports, elbow and knee pads, or braces  Follow up with your healthcare provider as directed:  Write down your questions so you remember to ask them during your visits  © 2017 [employer] Information is for End User's use only and may not be sold, redistributed or otherwise used for commercial purposes  All illustrations and images included in CareNotes® are the copyrighted property of [employer]  or [employer]  The above information is an  only  It is not intended as medical advice for individual conditions or treatments  Talk to your doctor, nurse or pharmacist before following any medical regimen to see if it is safe and effective for you  Pulmonary Nodules   WHAT YOU NEED TO KNOW:   Pulmonary nodules are areas of abnormal tissue in your lungs  You may not have any symptoms, or you may have chest tightness, a cough, chest pain, or shortness of breath  Nodules are usually found with an x-ray or CT scan  Most nodules are not cancerous  However, it is still important for you to return for follow-up testing to monitor your condition  DISCHARGE INSTRUCTIONS:   Call 911 for any of the following: You have severe shortness of breath or trouble breathing  Your lips or nails look blue or pale  Return to the emergency department if:   You cough up blood  You suddenly feel lightheaded or are short of breath  You have chest pain when you take a deep breath or cough      You cannot think clearly  Contact your healthcare provider if:   Your symptoms do not improve  You have new symptoms  You have questions or concerns about your condition or care  Follow up with your healthcare provider:  Your healthcare provider will refer you to a pulmonologist  Your pulmonologist will monitor your nodules for any change or growth  Nodules that grow quickly may require a biopsy to check for cancer  You may need to be monitored for 1 to 3 years  Write down your questions so you remember to ask them during your visits  Do not smoke:  Nicotine and other chemicals in cigarettes and cigars can cause lung damage or cancer  Stay away from others who smoke  Ask your healthcare provider for information if you or someone close to you currently smokes and needs help to quit  E-cigarettes or smokeless tobacco still contain nicotine  Talk to your healthcare provider before you use these products  © 2017 [address] [facility] Information is for End User's use only and may not be sold, redistributed or otherwise used for commercial purposes  All illustrations and images included in CareNotes® are the copyrighted property of [employer]  or [employer]  The above information is an  only  It is not intended as medical advice for individual conditions or treatments  Talk to your doctor, nurse or pharmacist before following any medical regimen to see if it is safe and effective for you

## 2020-08-21 ENCOUNTER — HOSPITAL ENCOUNTER (EMERGENCY)
Facility: HOSPITAL | Age: 45
Discharge: HOME | End: 2020-08-21
Attending: EMERGENCY MEDICINE | Admitting: EMERGENCY MEDICINE
Payer: COMMERCIAL

## 2020-08-21 VITALS
TEMPERATURE: 98.9 F | BODY MASS INDEX: 26.63 KG/M2 | OXYGEN SATURATION: 99 % | DIASTOLIC BLOOD PRESSURE: 72 MMHG | WEIGHT: 170 LBS | RESPIRATION RATE: 18 BRPM | SYSTOLIC BLOOD PRESSURE: 118 MMHG | HEART RATE: 82 BPM

## 2020-08-21 DIAGNOSIS — F32.A DEPRESSION: ICD-10-CM

## 2020-08-21 DIAGNOSIS — F41.9 ANXIETY: Primary | ICD-10-CM

## 2020-08-21 PROCEDURE — 99282 EMERGENCY DEPT VISIT SF MDM: CPT | Performed by: EMERGENCY MEDICINE

## 2020-08-21 PROCEDURE — 99283 EMERGENCY DEPT VISIT LOW MDM: CPT

## 2020-08-21 NOTE — DISCHARGE INSTRUCTIONS
Please follow up with crisis resources as provided, if symptoms worsen please return to emergency department

## 2020-08-21 NOTE — ED NOTES
Pt states he has no suicidal thoughts now and is seeking out patient therapy   Pt states he just needs to talk to someone about stressors in life     Ryan Baldwin, [address] [facility]  08/21/20 8001

## 2020-08-21 NOTE — ED NOTES
Pt states his wife left 2 weeks ago w kids  Pt states he large debt that he and wife share and tonight he was in garage and "didn't give a fuck "     Georgia Harry RN  08/21/20 7650

## 2020-08-21 NOTE — ED PROVIDER NOTES
History  Chief Complaint   Patient presents with    Anxiety     pt has increased anxiety since wife left 2 weeks ago  pt moved out of house and didnt take his daily meds     39 y/o male w/ PMhx of depression presents for evaluation of anxiety/depression due to increased stress  Patient states that is getting a divorce and just moved out of the house which is causing a lot of stress  He requests outpatient resources but does not want an inpatient psychiatric evaluation at this time  He states that he was on psychiatric medications before but it made him more suicidal  Denies alcohol or drug use  Denies current SI/HI/Hallucinations  Prior to Admission Medications   Prescriptions Last Dose Informant Patient Reported? Taking? albuterol (PROVENTIL HFA,VENTOLIN HFA) 90 mcg/act inhaler   No No   Sig: Inhale 2 puffs every 6 (six) hours as needed for wheezing   butalbital-acetaminophen-caffeine (FIORICET,ESGIC) -40 mg per tablet   Yes No   Sig: Take 1 tablet by mouth every 4 (four) hours as needed for headaches   cetirizine-pseudoephedrine (ZyrTEC-D) 5-120 MG per tablet   No No   Sig: Take 1 tablet by mouth 2 (two) times a day   fluticasone (FLONASE) 50 mcg/act nasal spray   No No   Si spray into each nostril daily   fluticasone-salmeterol (ADVAIR) 500-50 mcg/dose inhaler   No No   Sig: Inhale 1 puff 2 (two) times a day   ibuprofen (MOTRIN) 800 mg tablet   Yes No   Sig: Take by mouth every 6 (six) hours as needed for mild pain      Facility-Administered Medications: None       Past Medical History:   Diagnosis Date    Asthma     Hyperlipidemia     Hyperlipidemia     Major depression     Psychiatric disorder        History reviewed  No pertinent surgical history  History reviewed  No pertinent family history  I have reviewed and agree with the history as documented      E-Cigarette/Vaping    E-Cigarette Use Never User      E-Cigarette/Vaping Substances    Nicotine No     THC No     CBD No  Flavoring No     Other No     Unknown No      Social History     Tobacco Use    Smoking status: Current Every Day Smoker     Packs/day: 0 50     Types: Cigarettes    Smokeless tobacco: Never Used   Substance Use Topics    Alcohol use: Yes     Frequency: 2-4 times a month    Drug use: No       Review of Systems   Constitutional: Negative for appetite change, chills and fever  HENT: Negative for rhinorrhea and sore throat  Eyes: Negative for photophobia and visual disturbance  Respiratory: Negative for cough and shortness of breath  Cardiovascular: Negative for chest pain and palpitations  Gastrointestinal: Negative for abdominal pain and diarrhea  Genitourinary: Negative for dysuria, frequency and urgency  Skin: Negative for rash  Neurological: Negative for dizziness and weakness  Psychiatric/Behavioral: Positive for dysphoric mood and sleep disturbance  Negative for hallucinations, self-injury and suicidal ideas  The patient is nervous/anxious  All other systems reviewed and are negative  Physical Exam  Physical Exam  Vitals signs and nursing note reviewed  Constitutional:       Appearance: He is well-developed  HENT:      Head: Normocephalic and atraumatic  Right Ear: External ear normal       Left Ear: External ear normal    Eyes:      Conjunctiva/sclera: Conjunctivae normal       Pupils: Pupils are equal, round, and reactive to light  Neck:      Musculoskeletal: Normal range of motion and neck supple  Vascular: No JVD  Trachea: No tracheal deviation  Cardiovascular:      Rate and Rhythm: Normal rate and regular rhythm  Heart sounds: Normal heart sounds  No murmur  No friction rub  No gallop  Pulmonary:      Effort: Pulmonary effort is normal  No respiratory distress  Breath sounds: No stridor  No wheezing or rales  Abdominal:      General: There is no distension  Palpations: Abdomen is soft  There is no mass  Tenderness:  There is no abdominal tenderness  There is no guarding or rebound  Musculoskeletal: Normal range of motion  Skin:     General: Skin is warm and dry  Coloration: Skin is not pale  Findings: No erythema or rash  Neurological:      Mental Status: He is alert and oriented to person, place, and time  Cranial Nerves: No cranial nerve deficit  Psychiatric:         Attention and Perception: He does not perceive auditory or visual hallucinations  Mood and Affect: Mood normal          Thought Content: Thought content is not paranoid or delusional  Thought content does not include homicidal or suicidal ideation  Thought content does not include homicidal or suicidal plan  Vital Signs  ED Triage Vitals [08/21/20 0217]   Temperature Pulse Respirations Blood Pressure SpO2   98 9 °F (37 2 °C) 94 18 113/88 97 %      Temp Source Heart Rate Source Patient Position - Orthostatic VS BP Location FiO2 (%)   Oral Monitor Lying Right arm --      Pain Score       --           Vitals:    08/21/20 0217 08/21/20 0345   BP: 113/88 118/72   Pulse: 94 82   Patient Position - Orthostatic VS: Lying          Visual Acuity      ED Medications  Medications - No data to display    Diagnostic Studies  Results Reviewed     None                 No orders to display              Procedures  Procedures         ED Course       US AUDIT      Most Recent Value   Initial Alcohol Screen: US AUDIT-C    1  How often do you have a drink containing alcohol? 2 Filed at: 08/21/2020 0346   2  How many drinks containing alcohol do you have on a typical day you are drinking? 1 Filed at: 08/21/2020 0346   3a  Male UNDER 65: How often do you have five or more drinks on one occasion? 0 Filed at: 08/21/2020 0346   3b  FEMALE Any Age, or MALE 65+: How often do you have 4 or more drinks on one occassion?   0 Filed at: 08/21/2020 0346   Audit-C Score  3 Filed at: 08/21/2020 0346                  AMALIA/DAST-10      Most Recent Value   How many times in the past year have you    Used an illegal drug or used a prescription medication for non-medical reasons? Never Filed at: 08/21/2020 0346                                [facility]  Number of Diagnoses or Management Options  Anxiety:   Depression:   Diagnosis management comments: 37 y/o male with depression anxiety due to life stressors, no active SI/HI, clinically sober, will provide outpatient resources as requested        Disposition  Final diagnoses:   Anxiety   Depression     Time reflects when diagnosis was documented in both MDM as applicable and the Disposition within this note     Time User Action Codes Description Comment    8/21/2020  3:45 AM Angella Lynchs Add [F41 9] Anxiety     8/21/2020  3:45 AM Angella Sacharitys Add [F32 9] Depression       ED Disposition     ED Disposition Condition Date/Time Comment    Discharge Stable Fri Aug 21, 2020  3:45 AM Pete Rivera discharge to home/self care  Follow-up Information     Follow up With Specialties Details Why Contact Info Additional Information    Alejandro Schreiber  Schedule an appointment as soon as possible for a visit   [address] Emergency Department Emergency Medicine  If symptoms worsen [address] [id number] [phone]  ED, [address] [place] [address] [address]          Patient's Medications   Discharge Prescriptions    No medications on file     No discharge procedures on file      PDMP Review     None          ED Provider  Electronically Signed by           Luca Argueta MD  08/21/20 0046